# Patient Record
Sex: FEMALE | Race: WHITE
[De-identification: names, ages, dates, MRNs, and addresses within clinical notes are randomized per-mention and may not be internally consistent; named-entity substitution may affect disease eponyms.]

---

## 2021-03-21 ENCOUNTER — HOSPITAL ENCOUNTER (EMERGENCY)
Dept: HOSPITAL 49 - FER | Age: 66
Discharge: OTHER INSTITUTION | End: 2021-03-21
Payer: COMMERCIAL

## 2021-03-21 DIAGNOSIS — Z88.0: ICD-10-CM

## 2021-03-21 DIAGNOSIS — Z20.822: ICD-10-CM

## 2021-03-21 DIAGNOSIS — K21.9: ICD-10-CM

## 2021-03-21 DIAGNOSIS — Z88.8: ICD-10-CM

## 2021-03-21 DIAGNOSIS — I10: ICD-10-CM

## 2021-03-21 DIAGNOSIS — Z79.4: ICD-10-CM

## 2021-03-21 DIAGNOSIS — Z91.048: ICD-10-CM

## 2021-03-21 DIAGNOSIS — Z79.899: ICD-10-CM

## 2021-03-21 DIAGNOSIS — Z88.5: ICD-10-CM

## 2021-03-21 DIAGNOSIS — J44.9: ICD-10-CM

## 2021-03-21 DIAGNOSIS — K92.0: Primary | ICD-10-CM

## 2021-03-21 DIAGNOSIS — E11.9: ICD-10-CM

## 2021-03-21 LAB
ALBUMIN SERPL-MCNC: 2.3 G/DL (ref 3.4–5)
ALKALINE PHOSHATASE: 175 U/L (ref 46–116)
ALT SERPL-CCNC: 12 U/L (ref 14–59)
AST: 15 U/L (ref 15–37)
BASOPHIL: 0.3 % (ref 0–2)
BASOPHIL: 0.3 % (ref 0–2)
BILIRUBIN - TOTAL: 0.4 MG/DL (ref 0.2–1)
BUN SERPL-MCNC: 16 MG/DL (ref 7–18)
BUN/CREAT RATIO (CALC): 18 RATIO
CHLORIDE: 101 MMOL/L (ref 98–107)
CO2 (BICARBONATE): 28 MMOL/L (ref 21–32)
CORONAVIRUS 2019 SARS-COV-2: NEGATIVE
CREATININE: 0.89 MG/DL (ref 0.51–0.95)
EOSINOPHIL: 0 % (ref 0–7)
EOSINOPHIL: 0.3 % (ref 0–7)
GLOBULIN (CALCULATION): 4.2 G/DL
GLUCOSE SERPL-MCNC: 204 MG/DL (ref 74–106)
HCT: 23.8 % (ref 37–47)
HCT: 25.2 % (ref 37–47)
HGB BLD-MCNC: 7.5 G/DL (ref 12.5–16)
HGB BLD-MCNC: 7.9 G/DL (ref 12.5–16)
INFLUENZA A NAA: NEGATIVE
INR PPP: 1.27 (ref 0.9–1.2)
LIPASE: 137 U/L (ref 73–393)
LYMPHOCYTE: 5.7 % (ref 15–48)
LYMPHOCYTE: 6.6 % (ref 15–48)
MCH RBC QN AUTO: 27.8 PG (ref 25–31)
MCH RBC QN AUTO: 28.2 PG (ref 25–31)
MCHC RBC AUTO-ENTMCNC: 31.3 G/DL (ref 32–36)
MCHC RBC AUTO-ENTMCNC: 31.5 G/DL (ref 32–36)
MCV: 88.7 FL (ref 78–100)
MCV: 89.5 FL (ref 78–100)
MONOCYTE: 4.3 % (ref 0–12)
MONOCYTE: 4.9 % (ref 0–12)
MPV: 8.5 FL (ref 6–9.5)
MPV: 9.2 FL (ref 6–9.5)
NEUTROPHIL: 88 % (ref 41–80)
NEUTROPHIL: 88.2 % (ref 41–80)
NRBC: 0
NRBC: 0
PLT: 357 K/UL (ref 150–400)
PLT: 366 K/UL (ref 150–400)
POTASSIUM: 4.3 MMOL/L (ref 3.5–5.1)
PROTHROMBIN TIME: 15.1 SECONDS (ref 11.4–13.6)
PTT: 35.1 SECONDS (ref 22.2–34.7)
RBC MORPHOLOGY: NORMAL
RBC MORPHOLOGY: NORMAL
RBC: 2.66 M/UL (ref 4.2–5.4)
RBC: 2.84 M/UL (ref 4.2–5.4)
RDW: 17.5 % (ref 11.5–14)
RDW: 17.6 % (ref 11.5–14)
TOTAL PROTEIN: 6.5 G/DL (ref 6.4–8.2)
WBC: 11.5 K/UL (ref 4–10.5)
WBC: 11.6 K/UL (ref 4–10.5)

## 2021-03-21 PROCEDURE — U0002 COVID-19 LAB TEST NON-CDC: HCPCS

## 2021-03-21 PROCEDURE — C9113 INJ PANTOPRAZOLE SODIUM, VIA: HCPCS

## 2021-12-04 ENCOUNTER — HOSPITAL ENCOUNTER (EMERGENCY)
Dept: HOSPITAL 49 - FER | Age: 66
Discharge: HOME | End: 2021-12-04
Payer: MEDICARE

## 2021-12-04 DIAGNOSIS — E11.9: ICD-10-CM

## 2021-12-04 DIAGNOSIS — Z88.5: ICD-10-CM

## 2021-12-04 DIAGNOSIS — K21.9: Primary | ICD-10-CM

## 2021-12-04 DIAGNOSIS — R07.89: ICD-10-CM

## 2021-12-04 DIAGNOSIS — I10: ICD-10-CM

## 2021-12-04 DIAGNOSIS — Z88.0: ICD-10-CM

## 2021-12-04 DIAGNOSIS — Z88.8: ICD-10-CM

## 2021-12-04 DIAGNOSIS — F17.290: ICD-10-CM

## 2022-06-27 ENCOUNTER — HOSPITAL ENCOUNTER (INPATIENT)
Dept: HOSPITAL 49 - FER | Age: 67
LOS: 2 days | Discharge: HOME HEALTH | DRG: 522 | End: 2022-06-29
Attending: INTERNAL MEDICINE | Admitting: INTERNAL MEDICINE
Payer: MEDICARE

## 2022-06-27 VITALS — HEIGHT: 67.01 IN | BODY MASS INDEX: 33.02 KG/M2 | WEIGHT: 210.37 LBS

## 2022-06-27 DIAGNOSIS — F17.290: ICD-10-CM

## 2022-06-27 DIAGNOSIS — I95.9: ICD-10-CM

## 2022-06-27 DIAGNOSIS — F31.9: ICD-10-CM

## 2022-06-27 DIAGNOSIS — Z88.0: ICD-10-CM

## 2022-06-27 DIAGNOSIS — I50.32: ICD-10-CM

## 2022-06-27 DIAGNOSIS — Z99.81: ICD-10-CM

## 2022-06-27 DIAGNOSIS — I11.0: ICD-10-CM

## 2022-06-27 DIAGNOSIS — S72.002A: Primary | ICD-10-CM

## 2022-06-27 DIAGNOSIS — E66.01: ICD-10-CM

## 2022-06-27 DIAGNOSIS — Z20.822: ICD-10-CM

## 2022-06-27 DIAGNOSIS — K21.9: ICD-10-CM

## 2022-06-27 DIAGNOSIS — N30.00: ICD-10-CM

## 2022-06-27 DIAGNOSIS — Z98.51: ICD-10-CM

## 2022-06-27 DIAGNOSIS — Z88.5: ICD-10-CM

## 2022-06-27 DIAGNOSIS — Z82.49: ICD-10-CM

## 2022-06-27 DIAGNOSIS — F41.9: ICD-10-CM

## 2022-06-27 DIAGNOSIS — J44.9: ICD-10-CM

## 2022-06-27 DIAGNOSIS — Z88.8: ICD-10-CM

## 2022-06-27 DIAGNOSIS — Z90.49: ICD-10-CM

## 2022-06-27 DIAGNOSIS — E78.5: ICD-10-CM

## 2022-06-27 DIAGNOSIS — G89.4: ICD-10-CM

## 2022-06-27 DIAGNOSIS — E11.9: ICD-10-CM
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
## 2022-06-27 DIAGNOSIS — M25.512: ICD-10-CM

## 2022-06-27 LAB
ALBUMIN SERPL-MCNC: 2.8 G/DL (ref 3.4–5)
ALKALINE PHOSHATASE: 85 U/L (ref 46–116)
ALT SERPL-CCNC: 16 U/L (ref 14–59)
AST: 12 U/L (ref 15–37)
BACTERIA: (no result)
BASOPHIL: 0.3 % (ref 0–2)
BILIRUBIN - TOTAL: 0.3 MG/DL (ref 0.2–1)
BILIRUBIN: NEGATIVE MG/DL
BLOOD: NEGATIVE ERY/UL
BUN SERPL-MCNC: 15 MG/DL (ref 7–18)
BUN/CREAT RATIO (CALC): 15.6 RATIO
CHLORIDE: 100 MMOL/L (ref 98–107)
CLARITY UR: CLEAR
CO2 (BICARBONATE): 27 MMOL/L (ref 21–32)
COLOR: YELLOW
CREATININE: 0.96 MG/DL (ref 0.51–0.95)
EOSINOPHIL: 1.3 % (ref 0–7)
GLOBULIN (CALCULATION): 4.4 G/DL
GLUCOSE (U): NORMAL MG/DL
GLUCOSE SERPL-MCNC: 132 MG/DL (ref 74–106)
HCT: 35.5 % (ref 37–47)
HGB BLD-MCNC: 11.1 G/DL (ref 12.5–16)
LEUKOCYTES: (no result) LEU/UL
LYMPHOCYTE: 17.1 % (ref 15–48)
MCH RBC QN AUTO: 27.1 PG (ref 25–31)
MCHC RBC AUTO-ENTMCNC: 31.3 G/DL (ref 32–36)
MCV: 86.6 FL (ref 78–100)
MONOCYTE: 6.8 % (ref 0–12)
MPV: 9.6 FL (ref 6–9.5)
NEUTROPHIL: 73 % (ref 41–80)
NITRITE: POSITIVE MG/DL
NRBC: 0
PLT: 386 K/UL (ref 150–400)
POTASSIUM: 3.7 MMOL/L (ref 3.5–5.1)
PROTEIN: NEGATIVE MG/DL
RBC MORPHOLOGY: NORMAL
RBC: 4.1 M/UL (ref 4.2–5.4)
RDW: 15.5 % (ref 11.5–14)
SPECIFIC GRAVITY: 1.01 (ref 1–1.03)
SQUAMOUS EPITHELIAL CELLS: (no result)
TOTAL PROTEIN: 7.2 G/DL (ref 6.4–8.2)
URINARY WBC: (no result)
UROBILINOGEN: 0.2 MG/DL (ref 0.2–1)
WBC: 15.1 K/UL (ref 4–10.5)

## 2022-06-27 PROCEDURE — 0T9B70Z DRAINAGE OF BLADDER WITH DRAINAGE DEVICE, VIA NATURAL OR ARTIFICIAL OPENING: ICD-10-PCS | Performed by: INTERNAL MEDICINE

## 2022-06-27 PROCEDURE — C1776 JOINT DEVICE (IMPLANTABLE): HCPCS

## 2022-06-27 PROCEDURE — 05HY33Z INSERTION OF INFUSION DEVICE INTO UPPER VEIN, PERCUTANEOUS APPROACH: ICD-10-PCS | Performed by: INTERNAL MEDICINE

## 2022-06-27 PROCEDURE — B24BZZZ ULTRASONOGRAPHY OF HEART WITH AORTA: ICD-10-PCS

## 2022-06-27 PROCEDURE — U0002 COVID-19 LAB TEST NON-CDC: HCPCS

## 2022-06-27 NOTE — NUR
Order received for midine.  Risks and benefits explained for midline
placement.  Verbalized understanding.  Time Out completed
with Magda Nice RN.   Patients right upper arm vein visualized using the [facility].  The site was marked with a surgical marker.  The patient was
draped and prepped in sterile fashion.  The area was then numbed with 1 cc of
1 % Lidocaine. Time was given for Lidocaine to take effect.  A 21 gauge needle
was used to access the vein guided by the [facility] ultrasound.  Blood
return noted. The guide wire was advanced through the needle into the vein.
The needle was removed and the tourniquet released. The midline was inserted
over the guide wire into the vein.  The wire was removed and the midline noted
with blood return and flushed with several normal saline flushes. A sterile
dressing was placed.  Report given to BETI Wooten RN.

## 2022-06-28 LAB
BASOPHIL: 0.2 % (ref 0–2)
BUN SERPL-MCNC: 11 MG/DL (ref 7–18)
BUN/CREAT RATIO (CALC): 13.4 RATIO
CHLORIDE: 100 MMOL/L (ref 98–107)
CO2 (BICARBONATE): 28 MMOL/L (ref 21–32)
CREATININE: 0.82 MG/DL (ref 0.51–0.95)
EOSINOPHIL: 2.6 % (ref 0–7)
GLUCOSE SERPL-MCNC: 77 MG/DL (ref 74–106)
HCT: 35.6 % (ref 37–47)
HGB BLD-MCNC: 11.2 G/DL (ref 12.5–16)
INR PPP: 1.14 (ref 0.9–1.2)
LYMPHOCYTE: 11.2 % (ref 15–48)
MCH RBC QN AUTO: 27.3 PG (ref 25–31)
MCHC RBC AUTO-ENTMCNC: 31.5 G/DL (ref 32–36)
MCV: 86.6 FL (ref 78–100)
MONOCYTE: 9 % (ref 0–12)
MPV: 9.3 FL (ref 6–9.5)
NEUTROPHIL: 75.9 % (ref 41–80)
NRBC: 0
PLT: 336 K/UL (ref 150–400)
POTASSIUM: 4 MMOL/L (ref 3.5–5.1)
PROTHROMBIN TIME: 14 SECONDS (ref 11.8–13.4)
PTT: 34.8 SECONDS (ref 24.4–34.7)
RBC MORPHOLOGY: NORMAL
RBC: 4.11 M/UL (ref 4.2–5.4)
RDW: 15.6 % (ref 11.5–14)
WBC: 13.9 K/UL (ref 4–10.5)

## 2022-06-28 PROCEDURE — 0SRB0JA REPLACEMENT OF LEFT HIP JOINT WITH SYNTHETIC SUBSTITUTE, UNCEMENTED, OPEN APPROACH: ICD-10-PCS | Performed by: ORTHOPAEDIC SURGERY

## 2022-06-29 LAB
BASOPHIL: 0.1 % (ref 0–2)
BUN SERPL-MCNC: 18 MG/DL (ref 7–18)
BUN/CREAT RATIO (CALC): 18.4 RATIO
CHLORIDE: 98 MMOL/L (ref 98–107)
CO2 (BICARBONATE): 28 MMOL/L (ref 21–32)
CREATININE: 0.98 MG/DL (ref 0.51–0.95)
EOSINOPHIL: 0 % (ref 0–7)
GLUCOSE SERPL-MCNC: 143 MG/DL (ref 74–106)
HCT: 31.1 % (ref 37–47)
HGB BLD-MCNC: 9.5 G/DL (ref 12.5–16)
LYMPHOCYTE: 3.7 % (ref 15–48)
MAGNESIUM SERPL-MCNC: 1.7 MG/DL (ref 1.8–2.4)
MCH RBC QN AUTO: 27.4 PG (ref 25–31)
MCHC RBC AUTO-ENTMCNC: 30.5 G/DL (ref 32–36)
MCV: 89.6 FL (ref 78–100)
MONOCYTE: 5.3 % (ref 0–12)
MPV: 9.9 FL (ref 6–9.5)
NEUTROPHIL: 89.6 % (ref 41–80)
NRBC: 0
PLT: 288 K/UL (ref 150–400)
POTASSIUM: 5.1 MMOL/L (ref 3.5–5.1)
RBC MORPHOLOGY: NORMAL
RBC: 3.47 M/UL (ref 4.2–5.4)
RDW: 15.5 % (ref 11.5–14)
WBC: 27.9 K/UL (ref 4–10.5)

## 2022-06-29 NOTE — NUR
MET WITH PT. SHE RESIDES WITH HER SPOUSE.  SHE HAS A ROLLTOR AND 3/1.  SHE
ALSO HAS HOME O2 AT 2L.  SHE USES THE O2 AT NIGHT AND DURING THE DAY AS
NEEDED.  SHE HAS A PORTABLE SHE WILL USE TO GO HOME.  PT. REQUESTED
[facility]. CHOICE FORM SIGNED AND COPY GIVEN.

## 2022-09-15 ENCOUNTER — HOSPITAL ENCOUNTER (INPATIENT)
Dept: HOSPITAL 49 - FER | Age: 67
LOS: 6 days | Discharge: OTHER INSTITUTION | DRG: 177 | End: 2022-09-21
Attending: INTERNAL MEDICINE | Admitting: INTERNAL MEDICINE
Payer: MEDICARE

## 2022-09-15 VITALS — HEIGHT: 66.93 IN | BODY MASS INDEX: 30.45 KG/M2 | WEIGHT: 194.01 LBS

## 2022-09-15 DIAGNOSIS — J44.9: ICD-10-CM

## 2022-09-15 DIAGNOSIS — I50.32: ICD-10-CM

## 2022-09-15 DIAGNOSIS — K21.9: ICD-10-CM

## 2022-09-15 DIAGNOSIS — I25.2: ICD-10-CM

## 2022-09-15 DIAGNOSIS — E66.9: ICD-10-CM

## 2022-09-15 DIAGNOSIS — E11.43: ICD-10-CM

## 2022-09-15 DIAGNOSIS — Z90.710: ICD-10-CM

## 2022-09-15 DIAGNOSIS — E43: ICD-10-CM

## 2022-09-15 DIAGNOSIS — K22.0: ICD-10-CM

## 2022-09-15 DIAGNOSIS — F41.9: ICD-10-CM

## 2022-09-15 DIAGNOSIS — Z90.49: ICD-10-CM

## 2022-09-15 DIAGNOSIS — E78.5: ICD-10-CM

## 2022-09-15 DIAGNOSIS — I25.10: ICD-10-CM

## 2022-09-15 DIAGNOSIS — M51.36: ICD-10-CM

## 2022-09-15 DIAGNOSIS — K31.84: ICD-10-CM

## 2022-09-15 DIAGNOSIS — Z82.49: ICD-10-CM

## 2022-09-15 DIAGNOSIS — K22.89: ICD-10-CM

## 2022-09-15 DIAGNOSIS — Z87.891: ICD-10-CM

## 2022-09-15 DIAGNOSIS — K44.9: ICD-10-CM

## 2022-09-15 DIAGNOSIS — Z88.0: ICD-10-CM

## 2022-09-15 DIAGNOSIS — G89.29: ICD-10-CM

## 2022-09-15 DIAGNOSIS — E11.40: ICD-10-CM

## 2022-09-15 DIAGNOSIS — Z99.81: ICD-10-CM

## 2022-09-15 DIAGNOSIS — K58.1: ICD-10-CM

## 2022-09-15 DIAGNOSIS — I11.0: ICD-10-CM

## 2022-09-15 DIAGNOSIS — M19.90: ICD-10-CM

## 2022-09-15 DIAGNOSIS — T18.128A: ICD-10-CM

## 2022-09-15 DIAGNOSIS — Z79.82: ICD-10-CM

## 2022-09-15 DIAGNOSIS — Z20.822: ICD-10-CM

## 2022-09-15 DIAGNOSIS — R91.8: ICD-10-CM

## 2022-09-15 DIAGNOSIS — Z88.5: ICD-10-CM

## 2022-09-15 DIAGNOSIS — D50.9: ICD-10-CM

## 2022-09-15 DIAGNOSIS — Z86.16: ICD-10-CM

## 2022-09-15 DIAGNOSIS — Z79.899: ICD-10-CM

## 2022-09-15 DIAGNOSIS — F31.9: ICD-10-CM

## 2022-09-15 DIAGNOSIS — J69.0: Primary | ICD-10-CM

## 2022-09-15 LAB
ALBUMIN SERPL-MCNC: 2.9 G/DL (ref 3.4–5)
ALKALINE PHOSHATASE: 75 U/L (ref 46–116)
ALT SERPL-CCNC: 14 U/L (ref 14–59)
AMYLASE SERPL-CCNC: 13 U/L (ref 25–115)
AST: 19 U/L (ref 15–37)
BASOPHIL: 0.2 % (ref 0–2)
BILIRUBIN - TOTAL: 0.3 MG/DL (ref 0.2–1)
BILIRUBIN: NEGATIVE MG/DL
BLOOD: NEGATIVE ERY/UL
BUN SERPL-MCNC: 8 MG/DL (ref 7–18)
BUN/CREAT RATIO (CALC): 9.8 RATIO
CHLORIDE: 102 MMOL/L (ref 98–107)
CLARITY UR: CLEAR
CO2 (BICARBONATE): 34 MMOL/L (ref 21–32)
COLOR: YELLOW
CORONAVIRUS 2019 SARS-COV-2: NEGATIVE
CREATININE: 0.82 MG/DL (ref 0.51–0.95)
EOSINOPHIL: 1.7 % (ref 0–7)
GLOBULIN (CALCULATION): 4.4 G/DL
GLUCOSE (U): NORMAL MG/DL
GLUCOSE SERPL-MCNC: 126 MG/DL (ref 74–106)
HCT: 39.4 % (ref 37–47)
HGB BLD-MCNC: 12 G/DL (ref 12.5–16)
INFLUENZA A NAA: NEGATIVE
LEUKOCYTES: NEGATIVE LEU/UL
LIPASE: 108 U/L (ref 73–393)
LYMPHOCYTE: 16.3 % (ref 15–48)
MCH RBC QN AUTO: 27 PG (ref 25–31)
MCHC RBC AUTO-ENTMCNC: 30.5 G/DL (ref 32–36)
MCV: 88.7 FL (ref 78–100)
MONOCYTE: 7.6 % (ref 0–12)
MPV: 10.1 FL (ref 6–9.5)
NEUTROPHIL: 73.8 % (ref 41–80)
NITRITE: NEGATIVE MG/DL
NRBC: 0
PLT: 400 K/UL (ref 150–400)
POTASSIUM: 4.3 MMOL/L (ref 3.5–5.1)
PROTEIN: NEGATIVE MG/DL
RBC MORPHOLOGY: NORMAL
RBC: 4.44 M/UL (ref 4.2–5.4)
RDW: 16.6 % (ref 11.5–14)
SPECIFIC GRAVITY: 1.01 (ref 1–1.03)
TOTAL PROTEIN: 7.3 G/DL (ref 6.4–8.2)
UROBILINOGEN: 0.2 MG/DL (ref 0.2–1)
WBC: 11.5 K/UL (ref 4–10.5)

## 2022-09-15 PROCEDURE — U0002 COVID-19 LAB TEST NON-CDC: HCPCS

## 2022-09-15 PROCEDURE — C9113 INJ PANTOPRAZOLE SODIUM, VIA: HCPCS

## 2022-09-15 PROCEDURE — C1751 CATH, INF, PER/CENT/MIDLINE: HCPCS

## 2022-09-15 PROCEDURE — G0378 HOSPITAL OBSERVATION PER HR: HCPCS

## 2022-09-16 LAB
BUN SERPL-MCNC: 9 MG/DL (ref 7–18)
BUN/CREAT RATIO (CALC): 9.3 RATIO
C-REACTIVE PROTEIN: 1.3 MG/DL (ref ?–0.9)
CHLORIDE: 103 MMOL/L (ref 98–107)
CO2 (BICARBONATE): 31 MMOL/L (ref 21–32)
CREATININE: 0.97 MG/DL (ref 0.51–0.95)
GLUCOSE SERPL-MCNC: 84 MG/DL (ref 74–106)
INR PPP: 1.06 (ref 0.9–1.2)
IRON % SATURATION: 27.5 %SAT (ref 20–50)
IRON SERPL-MCNC: 82 UG/DL (ref 50–170)
POTASSIUM: 3.9 MMOL/L (ref 3.5–5.1)
PROTHROMBIN TIME: 13.5 SECONDS (ref 11.9–13.9)
RETICULOCYTE COUNT: 1.8 % (ref 1–2)

## 2022-09-16 PROCEDURE — 0DC28ZZ EXTIRPATION OF MATTER FROM MIDDLE ESOPHAGUS, VIA NATURAL OR ARTIFICIAL OPENING ENDOSCOPIC: ICD-10-PCS | Performed by: STUDENT IN AN ORGANIZED HEALTH CARE EDUCATION/TRAINING PROGRAM

## 2022-09-17 LAB
BASOPHIL: 0.1 % (ref 0–2)
BUN SERPL-MCNC: 12 MG/DL (ref 7–18)
BUN/CREAT RATIO (CALC): 13 RATIO
CHLORIDE: 102 MMOL/L (ref 98–107)
CO2 (BICARBONATE): 28 MMOL/L (ref 21–32)
CREATININE: 0.92 MG/DL (ref 0.51–0.95)
EOSINOPHIL: 0.1 % (ref 0–7)
GLUCOSE SERPL-MCNC: 140 MG/DL (ref 74–106)
HCT: 34.8 % (ref 37–47)
HGB BLD-MCNC: 11.1 G/DL (ref 12.5–16)
LYMPHOCYTE: 3.7 % (ref 15–48)
MCH RBC QN AUTO: 27.7 PG (ref 25–31)
MCHC RBC AUTO-ENTMCNC: 31.9 G/DL (ref 32–36)
MCV: 86.8 FL (ref 78–100)
MONOCYTE: 5.9 % (ref 0–12)
MPV: 9.5 FL (ref 6–9.5)
NEUTROPHIL: 89.6 % (ref 41–80)
NRBC: 0
PLT: 339 K/UL (ref 150–400)
POTASSIUM: 4.1 MMOL/L (ref 3.5–5.1)
RBC MORPHOLOGY: NORMAL
RBC: 4.01 M/UL (ref 4.2–5.4)
RDW: 16.8 % (ref 11.5–14)
WBC: 27.4 K/UL (ref 4–10.5)

## 2022-09-18 LAB
MAGNESIUM SERPL-MCNC: 1.5 MG/DL (ref 1.8–2.4)
PHOSPHORUS: 2.4 MG/DL (ref 2.6–4.7)

## 2022-09-18 PROCEDURE — 3E0436Z INTRODUCTION OF NUTRITIONAL SUBSTANCE INTO CENTRAL VEIN, PERCUTANEOUS APPROACH: ICD-10-PCS | Performed by: INTERNAL MEDICINE

## 2022-09-18 PROCEDURE — 02HV33Z INSERTION OF INFUSION DEVICE INTO SUPERIOR VENA CAVA, PERCUTANEOUS APPROACH: ICD-10-PCS | Performed by: INTERNAL MEDICINE

## 2022-09-19 LAB
ALBUMIN SERPL-MCNC: 1.7 G/DL (ref 3.4–5)
ALKALINE PHOSHATASE: 67 U/L (ref 46–116)
ALT SERPL-CCNC: 13 U/L (ref 14–59)
AST: 10 U/L (ref 15–37)
BASOPHIL: 0.1 % (ref 0–2)
BILIRUBIN - TOTAL: 0.4 MG/DL (ref 0.2–1)
BUN SERPL-MCNC: 11 MG/DL (ref 7–18)
BUN/CREAT RATIO (CALC): 16.7 RATIO
CHLORIDE: 100 MMOL/L (ref 98–107)
CO2 (BICARBONATE): 27 MMOL/L (ref 21–32)
CREATININE: 0.66 MG/DL (ref 0.51–0.95)
EOSINOPHIL: 1.1 % (ref 0–7)
GLOBULIN (CALCULATION): 4 G/DL
GLUCOSE SERPL-MCNC: 214 MG/DL (ref 74–106)
HCT: 29.1 % (ref 37–47)
HGB BLD-MCNC: 9 G/DL (ref 12.5–16)
INR PPP: 1.25 (ref 0.9–1.2)
LYMPHOCYTE: 8.1 % (ref 15–48)
MAGNESIUM SERPL-MCNC: 1.8 MG/DL (ref 1.8–2.4)
MCH RBC QN AUTO: 27.1 PG (ref 25–31)
MCHC RBC AUTO-ENTMCNC: 30.9 G/DL (ref 32–36)
MCV: 87.7 FL (ref 78–100)
MONOCYTE: 8.7 % (ref 0–12)
MPV: 10.4 FL (ref 6–9.5)
NEUTROPHIL: 81.2 % (ref 41–80)
NRBC: 0
PHOSPHORUS: 3 MG/DL (ref 2.6–4.7)
PLT: 237 K/UL (ref 150–400)
POTASSIUM: 4.2 MMOL/L (ref 3.5–5.1)
PROTHROMBIN TIME: 15.3 SECONDS (ref 11.9–13.9)
PTT: 46.4 SECONDS (ref 24.9–34.6)
RBC MORPHOLOGY: NORMAL
RBC: 3.32 M/UL (ref 4.2–5.4)
RDW: 16.5 % (ref 11.5–14)
TOTAL PROTEIN: 5.7 G/DL (ref 6.4–8.2)
TRIGLYCERIDES: 94 MG/DL (ref ?–150)
WBC: 14 K/UL (ref 4–10.5)